# Patient Record
Sex: FEMALE | Race: BLACK OR AFRICAN AMERICAN | NOT HISPANIC OR LATINO | URBAN - NONMETROPOLITAN AREA
[De-identification: names, ages, dates, MRNs, and addresses within clinical notes are randomized per-mention and may not be internally consistent; named-entity substitution may affect disease eponyms.]

---

## 2019-09-12 ENCOUNTER — IMPORTED ENCOUNTER (OUTPATIENT)
Dept: URBAN - NONMETROPOLITAN AREA CLINIC 1 | Facility: CLINIC | Age: 44
End: 2019-09-12

## 2019-09-12 PROCEDURE — 92015 DETERMINE REFRACTIVE STATE: CPT

## 2019-09-12 PROCEDURE — 92014 COMPRE OPH EXAM EST PT 1/>: CPT

## 2019-09-12 PROCEDURE — 92310 CONTACT LENS FITTING OU: CPT

## 2019-09-12 NOTE — PATIENT DISCUSSION
Myopia-Discussed diagnosis with patient. -Explained that people who are myopic are at a higher risk for developing RD/RT and reviewed associated S&S.-Pt to contact our office if symptoms develop. Updated spec Rx given. Recommend lens that will provide comfort as well as protect safety and health of eyes. Updated CL Rx given.; Dr's Notes: PCP Dr. Flavio Mejias in [place]

## 2020-12-09 ENCOUNTER — PREPPED CHART (OUTPATIENT)
Dept: RURAL CLINIC 1 | Facility: CLINIC | Age: 45
End: 2020-12-09

## 2020-12-09 ENCOUNTER — IMPORTED ENCOUNTER (OUTPATIENT)
Dept: URBAN - NONMETROPOLITAN AREA CLINIC 1 | Facility: CLINIC | Age: 45
End: 2020-12-09

## 2020-12-09 PROCEDURE — 92015 DETERMINE REFRACTIVE STATE: CPT

## 2020-12-09 PROCEDURE — 92310 CONTACT LENS FITTING OU: CPT

## 2020-12-09 PROCEDURE — 92014 COMPRE OPH EXAM EST PT 1/>: CPT

## 2020-12-09 NOTE — PATIENT DISCUSSION
Myopia-Discussed diagnosis with patient. -Explained that people who are myopic are at a higher risk for developing RD/RT and reviewed associated S&S.-Pt to contact our office if symptoms develop. Astigmatism-Discussed diagnosis with patient. Updated spec Rx given. Recommend lens that will provide comfort as well as protect safety and health of eyes. CL wear-CLs fit and center well.-Stressed that patient should not sleep in CL. -Updated CL Rx given. -CL care and precautions given.; Dr's Notes: PCP Dr. Mariana Dobbs in [place]

## 2021-02-15 ENCOUNTER — IMPORTED ENCOUNTER (OUTPATIENT)
Dept: URBAN - NONMETROPOLITAN AREA CLINIC 1 | Facility: CLINIC | Age: 46
End: 2021-02-15

## 2021-02-15 PROBLEM — H10.45: Noted: 2021-02-15

## 2021-02-15 PROCEDURE — 99213 OFFICE O/P EST LOW 20 MIN: CPT

## 2021-02-15 NOTE — PATIENT DISCUSSION
*Allergic Conjunctivitis:.-Discussed findings of exam in detail with the patient. - Discussed the episodic nature of the condition and treatment options with the patient. - The use of artificial tears and cool compresses were discussed with patient. - The offending allergen should be avoided if possible. - Patient advised not to rub eyes. - Prescription drops recommended. start pf qid ou x 10 days; 's Notes: PCP Dr. Vidhi Jc in [place]

## 2022-04-07 ASSESSMENT — VISUAL ACUITY
OS_CC: 20/20
OD_CC: 20/20
OU_CC: 20/25

## 2022-04-07 ASSESSMENT — TONOMETRY
OD_IOP_MMHG: 19
OS_IOP_MMHG: 19

## 2022-04-08 ENCOUNTER — COMPREHENSIVE EXAM (OUTPATIENT)
Dept: RURAL CLINIC 1 | Facility: CLINIC | Age: 47
End: 2022-04-08

## 2022-04-08 DIAGNOSIS — H52.13: ICD-10-CM

## 2022-04-08 DIAGNOSIS — H52.223: ICD-10-CM

## 2022-04-08 PROCEDURE — 92310-E CONTACT LENS FITTING ESTABLISH PATIENT

## 2022-04-08 PROCEDURE — 92014 COMPRE OPH EXAM EST PT 1/>: CPT

## 2022-04-08 PROCEDURE — 92015 DETERMINE REFRACTIVE STATE: CPT

## 2022-04-09 ASSESSMENT — VISUAL ACUITY
OS_CC: J1
OS_CC: J1
OS_SC: 20/20
OS_SC: 20/20
OD_CC: J1
OD_SC: 20/40
OD_SC: 20/20
OS_SC: 20/25
OD_CC: J1
OD_SC: 20/20

## 2022-04-09 ASSESSMENT — TONOMETRY
OS_IOP_MMHG: 19
OD_IOP_MMHG: 19

## 2023-12-19 ENCOUNTER — ESTABLISHED PATIENT (OUTPATIENT)
Dept: RURAL CLINIC 1 | Facility: CLINIC | Age: 48
End: 2023-12-19

## 2023-12-19 DIAGNOSIS — E11.9: ICD-10-CM

## 2023-12-19 PROCEDURE — 92014 COMPRE OPH EXAM EST PT 1/>: CPT

## 2023-12-19 ASSESSMENT — VISUAL ACUITY
OU_CC: 20/20
OU_CC: 20/20
OS_CC: 20/20-1
OD_CC: 20/25+1
OS_CC: 20/20
OD_CC: 20/20

## 2023-12-19 ASSESSMENT — TONOMETRY
OD_IOP_MMHG: 14
OS_IOP_MMHG: 14

## 2024-07-29 ENCOUNTER — COMPREHENSIVE EXAM (OUTPATIENT)
Dept: RURAL CLINIC 1 | Facility: CLINIC | Age: 49
End: 2024-07-29

## 2024-07-29 DIAGNOSIS — H52.13: ICD-10-CM

## 2024-07-29 DIAGNOSIS — H52.223: ICD-10-CM

## 2024-07-29 DIAGNOSIS — E11.9: ICD-10-CM

## 2024-07-29 PROCEDURE — 92014 COMPRE OPH EXAM EST PT 1/>: CPT

## 2024-07-29 PROCEDURE — 92310-E CONTACT LENS FITTING ESTABLISH PATIENT

## 2024-07-29 PROCEDURE — 92015 DETERMINE REFRACTIVE STATE: CPT

## 2024-07-29 ASSESSMENT — VISUAL ACUITY
OU_CC: 20/20
OD_CC: 20/20
OS_CC: 20/20
OD_CC: 20/50
OU_CC: 20/20
OS_CC: 20/20

## 2024-07-29 ASSESSMENT — TONOMETRY
OD_IOP_MMHG: 15
OS_IOP_MMHG: 15

## 2025-08-22 ENCOUNTER — COMPREHENSIVE EXAM (OUTPATIENT)
Age: 50
End: 2025-08-22

## 2025-08-22 DIAGNOSIS — H52.223: ICD-10-CM

## 2025-08-22 DIAGNOSIS — H52.13: ICD-10-CM

## 2025-08-22 DIAGNOSIS — E11.9: ICD-10-CM

## 2025-08-22 PROCEDURE — 92015 DETERMINE REFRACTIVE STATE: CPT

## 2025-08-22 PROCEDURE — 92310-1 LEVEL 1 SOFT LENS UPDATE

## 2025-08-22 PROCEDURE — 92014 COMPRE OPH EXAM EST PT 1/>: CPT
